# Patient Record
Sex: MALE | Race: WHITE | Employment: UNEMPLOYED | URBAN - METROPOLITAN AREA
[De-identification: names, ages, dates, MRNs, and addresses within clinical notes are randomized per-mention and may not be internally consistent; named-entity substitution may affect disease eponyms.]

---

## 2023-07-28 ENCOUNTER — APPOINTMENT (OUTPATIENT)
Dept: GENERAL RADIOLOGY | Facility: HOSPITAL | Age: 73
End: 2023-07-28
Attending: EMERGENCY MEDICINE
Payer: MEDICARE

## 2023-07-28 ENCOUNTER — HOSPITAL ENCOUNTER (INPATIENT)
Facility: HOSPITAL | Age: 73
LOS: 4 days | Discharge: HOME | End: 2023-08-01
Attending: EMERGENCY MEDICINE | Admitting: INTERNAL MEDICINE
Payer: MEDICARE

## 2023-07-28 ENCOUNTER — APPOINTMENT (OUTPATIENT)
Dept: CT IMAGING | Facility: HOSPITAL | Age: 73
End: 2023-07-28
Attending: EMERGENCY MEDICINE
Payer: MEDICARE

## 2023-07-28 DIAGNOSIS — R77.8 ELEVATED TROPONIN: ICD-10-CM

## 2023-07-28 DIAGNOSIS — R73.9 HYPERGLYCEMIA: ICD-10-CM

## 2023-07-28 DIAGNOSIS — R55 SYNCOPE, CARDIOGENIC: Primary | ICD-10-CM

## 2023-07-28 LAB
ALBUMIN SERPL-MCNC: 3.5 G/DL (ref 3.4–5)
ALBUMIN/GLOB SERPL: 0.9 {RATIO} (ref 1–2)
ALP LIVER SERPL-CCNC: 92 U/L
ALT SERPL-CCNC: 19 U/L
ANION GAP SERPL CALC-SCNC: 8 MMOL/L (ref 0–18)
AST SERPL-CCNC: 15 U/L (ref 15–37)
BASOPHILS # BLD AUTO: 0.09 X10(3) UL (ref 0–0.2)
BASOPHILS NFR BLD AUTO: 1 %
BILIRUB SERPL-MCNC: 0.6 MG/DL (ref 0.1–2)
BILIRUB UR QL STRIP.AUTO: NEGATIVE
BUN BLD-MCNC: 22 MG/DL (ref 7–18)
CALCIUM BLD-MCNC: 8.7 MG/DL (ref 8.5–10.1)
CHLORIDE SERPL-SCNC: 100 MMOL/L (ref 98–112)
CHOLEST SERPL-MCNC: 166 MG/DL (ref ?–200)
CLARITY UR REFRACT.AUTO: CLEAR
CO2 SERPL-SCNC: 28 MMOL/L (ref 21–32)
CREAT BLD-MCNC: 1.32 MG/DL
D DIMER PPP FEU-MCNC: <0.27 UG/ML FEU (ref ?–0.71)
EGFRCR SERPLBLD CKD-EPI 2021: 58 ML/MIN/1.73M2 (ref 60–?)
EOSINOPHIL # BLD AUTO: 0.11 X10(3) UL (ref 0–0.7)
EOSINOPHIL NFR BLD AUTO: 1.2 %
ERYTHROCYTE [DISTWIDTH] IN BLOOD BY AUTOMATED COUNT: 12.9 %
GLOBULIN PLAS-MCNC: 3.8 G/DL (ref 2.8–4.4)
GLUCOSE BLD-MCNC: 236 MG/DL (ref 70–99)
GLUCOSE BLD-MCNC: 269 MG/DL (ref 70–99)
GLUCOSE BLD-MCNC: 285 MG/DL (ref 70–99)
GLUCOSE UR STRIP.AUTO-MCNC: >=500 MG/DL
HCT VFR BLD AUTO: 47.1 %
HDLC SERPL-MCNC: 45 MG/DL (ref 40–59)
HGB BLD-MCNC: 16.1 G/DL
IMM GRANULOCYTES # BLD AUTO: 0.04 X10(3) UL (ref 0–1)
IMM GRANULOCYTES NFR BLD: 0.4 %
KETONES UR STRIP.AUTO-MCNC: 20 MG/DL
LDLC SERPL CALC-MCNC: 93 MG/DL (ref ?–100)
LEUKOCYTE ESTERASE UR QL STRIP.AUTO: NEGATIVE
LYMPHOCYTES # BLD AUTO: 2.4 X10(3) UL (ref 1–4)
LYMPHOCYTES NFR BLD AUTO: 26.3 %
MAGNESIUM SERPL-MCNC: 2.2 MG/DL (ref 1.6–2.6)
MCH RBC QN AUTO: 29.5 PG (ref 26–34)
MCHC RBC AUTO-ENTMCNC: 34.2 G/DL (ref 31–37)
MCV RBC AUTO: 86.3 FL
MONOCYTES # BLD AUTO: 0.6 X10(3) UL (ref 0.1–1)
MONOCYTES NFR BLD AUTO: 6.6 %
NEUTROPHILS # BLD AUTO: 5.9 X10 (3) UL (ref 1.5–7.7)
NEUTROPHILS # BLD AUTO: 5.9 X10(3) UL (ref 1.5–7.7)
NEUTROPHILS NFR BLD AUTO: 64.5 %
NITRITE UR QL STRIP.AUTO: NEGATIVE
NONHDLC SERPL-MCNC: 121 MG/DL (ref ?–130)
OSMOLALITY SERPL CALC.SUM OF ELEC: 296 MOSM/KG (ref 275–295)
PH UR STRIP.AUTO: 6 [PH] (ref 5–8)
PLATELET # BLD AUTO: 161 10(3)UL (ref 150–450)
POTASSIUM SERPL-SCNC: 3.4 MMOL/L (ref 3.5–5.1)
PROT SERPL-MCNC: 7.3 G/DL (ref 6.4–8.2)
PROT UR STRIP.AUTO-MCNC: NEGATIVE MG/DL
RBC # BLD AUTO: 5.46 X10(6)UL
RBC UR QL AUTO: NEGATIVE
SODIUM SERPL-SCNC: 136 MMOL/L (ref 136–145)
SP GR UR STRIP.AUTO: 1.03 (ref 1–1.03)
TRIGL SERPL-MCNC: 158 MG/DL (ref 30–149)
TROPONIN I HIGH SENSITIVITY: 77 NG/L
TROPONIN I HIGH SENSITIVITY: 84 NG/L
TSI SER-ACNC: 2.97 MIU/ML (ref 0.36–3.74)
UROBILINOGEN UR STRIP.AUTO-MCNC: <2 MG/DL
VLDLC SERPL CALC-MCNC: 26 MG/DL (ref 0–30)
WBC # BLD AUTO: 9.1 X10(3) UL (ref 4–11)

## 2023-07-28 PROCEDURE — 71045 X-RAY EXAM CHEST 1 VIEW: CPT | Performed by: EMERGENCY MEDICINE

## 2023-07-28 PROCEDURE — 70450 CT HEAD/BRAIN W/O DYE: CPT | Performed by: EMERGENCY MEDICINE

## 2023-07-28 PROCEDURE — 99223 1ST HOSP IP/OBS HIGH 75: CPT | Performed by: INTERNAL MEDICINE

## 2023-07-28 RX ORDER — SODIUM CHLORIDE 9 MG/ML
INJECTION, SOLUTION INTRAVENOUS CONTINUOUS
Status: DISCONTINUED | OUTPATIENT
Start: 2023-07-28 | End: 2023-08-01

## 2023-07-28 RX ORDER — POTASSIUM CHLORIDE 20 MEQ/1
40 TABLET, EXTENDED RELEASE ORAL EVERY 4 HOURS
Status: COMPLETED | OUTPATIENT
Start: 2023-07-28 | End: 2023-07-29

## 2023-07-28 RX ORDER — ENOXAPARIN SODIUM 100 MG/ML
40 INJECTION SUBCUTANEOUS DAILY
Status: DISCONTINUED | OUTPATIENT
Start: 2023-07-29 | End: 2023-08-01

## 2023-07-28 RX ORDER — LOSARTAN POTASSIUM 100 MG/1
100 TABLET ORAL DAILY
Status: DISCONTINUED | OUTPATIENT
Start: 2023-07-29 | End: 2023-08-01

## 2023-07-28 RX ORDER — METOPROLOL SUCCINATE 50 MG/1
50 TABLET, EXTENDED RELEASE ORAL DAILY
COMMUNITY

## 2023-07-28 RX ORDER — SENNOSIDES 8.6 MG
17.2 TABLET ORAL NIGHTLY PRN
Status: DISCONTINUED | OUTPATIENT
Start: 2023-07-28 | End: 2023-08-01

## 2023-07-28 RX ORDER — LOSARTAN POTASSIUM 25 MG/1
TABLET ORAL DAILY
COMMUNITY
End: 2023-07-28

## 2023-07-28 RX ORDER — ACETAMINOPHEN 500 MG
1000 TABLET ORAL EVERY 8 HOURS PRN
Status: DISCONTINUED | OUTPATIENT
Start: 2023-07-28 | End: 2023-08-01

## 2023-07-28 RX ORDER — NICOTINE POLACRILEX 4 MG
30 LOZENGE BUCCAL
Status: DISCONTINUED | OUTPATIENT
Start: 2023-07-28 | End: 2023-08-01

## 2023-07-28 RX ORDER — DEXTROSE MONOHYDRATE 25 G/50ML
50 INJECTION, SOLUTION INTRAVENOUS
Status: DISCONTINUED | OUTPATIENT
Start: 2023-07-28 | End: 2023-08-01

## 2023-07-28 RX ORDER — MELATONIN
3 NIGHTLY PRN
Status: DISCONTINUED | OUTPATIENT
Start: 2023-07-28 | End: 2023-08-01

## 2023-07-28 RX ORDER — ENEMA 19; 7 G/133ML; G/133ML
1 ENEMA RECTAL ONCE AS NEEDED
Status: DISCONTINUED | OUTPATIENT
Start: 2023-07-28 | End: 2023-08-01

## 2023-07-28 RX ORDER — NICOTINE POLACRILEX 4 MG
15 LOZENGE BUCCAL
Status: DISCONTINUED | OUTPATIENT
Start: 2023-07-28 | End: 2023-08-01

## 2023-07-28 RX ORDER — METOCLOPRAMIDE HYDROCHLORIDE 5 MG/ML
10 INJECTION INTRAMUSCULAR; INTRAVENOUS EVERY 8 HOURS PRN
Status: DISCONTINUED | OUTPATIENT
Start: 2023-07-28 | End: 2023-08-01

## 2023-07-28 RX ORDER — POLYETHYLENE GLYCOL 3350 17 G/17G
17 POWDER, FOR SOLUTION ORAL DAILY PRN
Status: DISCONTINUED | OUTPATIENT
Start: 2023-07-28 | End: 2023-08-01

## 2023-07-28 RX ORDER — BISACODYL 10 MG
10 SUPPOSITORY, RECTAL RECTAL
Status: DISCONTINUED | OUTPATIENT
Start: 2023-07-28 | End: 2023-08-01

## 2023-07-28 RX ORDER — VERAPAMIL HYDROCHLORIDE 240 MG/1
240 TABLET, FILM COATED, EXTENDED RELEASE ORAL NIGHTLY
COMMUNITY
End: 2023-08-01

## 2023-07-28 RX ORDER — ONDANSETRON 2 MG/ML
4 INJECTION INTRAMUSCULAR; INTRAVENOUS EVERY 6 HOURS PRN
Status: DISCONTINUED | OUTPATIENT
Start: 2023-07-28 | End: 2023-07-29

## 2023-07-28 RX ORDER — ASPIRIN 81 MG/1
81 TABLET ORAL DAILY
Status: DISCONTINUED | OUTPATIENT
Start: 2023-07-29 | End: 2023-08-01

## 2023-07-28 RX ORDER — EMPAGLIFLOZIN 25 MG/1
1 TABLET, FILM COATED ORAL DAILY
COMMUNITY
End: 2023-08-01

## 2023-07-28 RX ORDER — ASPIRIN 81 MG/1
324 TABLET, CHEWABLE ORAL ONCE
Status: COMPLETED | OUTPATIENT
Start: 2023-07-28 | End: 2023-07-28

## 2023-07-28 RX ORDER — ASPIRIN 81 MG/1
81 TABLET ORAL DAILY
COMMUNITY

## 2023-07-29 ENCOUNTER — APPOINTMENT (OUTPATIENT)
Dept: CV DIAGNOSTICS | Facility: HOSPITAL | Age: 73
End: 2023-07-29
Attending: NURSE PRACTITIONER
Payer: MEDICARE

## 2023-07-29 ENCOUNTER — APPOINTMENT (OUTPATIENT)
Dept: CV DIAGNOSTICS | Facility: HOSPITAL | Age: 73
End: 2023-07-29
Attending: INTERNAL MEDICINE
Payer: MEDICARE

## 2023-07-29 PROBLEM — E11.9 DIABETES MELLITUS (HCC): Status: ACTIVE | Noted: 2023-07-29

## 2023-07-29 LAB
ANION GAP SERPL CALC-SCNC: 5 MMOL/L (ref 0–18)
ATRIAL RATE: 54 BPM
ATRIAL RATE: 59 BPM
BASOPHILS # BLD AUTO: 0.06 X10(3) UL (ref 0–0.2)
BASOPHILS NFR BLD AUTO: 0.6 %
BUN BLD-MCNC: 20 MG/DL (ref 7–18)
CALCIUM BLD-MCNC: 8.7 MG/DL (ref 8.5–10.1)
CHLORIDE SERPL-SCNC: 103 MMOL/L (ref 98–112)
CO2 SERPL-SCNC: 28 MMOL/L (ref 21–32)
CREAT BLD-MCNC: 1.2 MG/DL
EGFRCR SERPLBLD CKD-EPI 2021: 65 ML/MIN/1.73M2 (ref 60–?)
EOSINOPHIL # BLD AUTO: 0.07 X10(3) UL (ref 0–0.7)
EOSINOPHIL NFR BLD AUTO: 0.7 %
ERYTHROCYTE [DISTWIDTH] IN BLOOD BY AUTOMATED COUNT: 12.7 %
EST. AVERAGE GLUCOSE BLD GHB EST-MCNC: 335 MG/DL (ref 68–126)
GLUCOSE BLD-MCNC: 216 MG/DL (ref 70–99)
GLUCOSE BLD-MCNC: 258 MG/DL (ref 70–99)
GLUCOSE BLD-MCNC: 266 MG/DL (ref 70–99)
GLUCOSE BLD-MCNC: 283 MG/DL (ref 70–99)
GLUCOSE BLD-MCNC: 407 MG/DL (ref 70–99)
HBA1C MFR BLD: 13.3 % (ref ?–5.7)
HCT VFR BLD AUTO: 45.9 %
HGB BLD-MCNC: 15.5 G/DL
IMM GRANULOCYTES # BLD AUTO: 0.04 X10(3) UL (ref 0–1)
IMM GRANULOCYTES NFR BLD: 0.4 %
LYMPHOCYTES # BLD AUTO: 2.14 X10(3) UL (ref 1–4)
LYMPHOCYTES NFR BLD AUTO: 22.3 %
MAGNESIUM SERPL-MCNC: 2.2 MG/DL (ref 1.6–2.6)
MCH RBC QN AUTO: 29.1 PG (ref 26–34)
MCHC RBC AUTO-ENTMCNC: 33.8 G/DL (ref 31–37)
MCV RBC AUTO: 86.1 FL
MONOCYTES # BLD AUTO: 0.76 X10(3) UL (ref 0.1–1)
MONOCYTES NFR BLD AUTO: 7.9 %
NEUTROPHILS # BLD AUTO: 6.52 X10 (3) UL (ref 1.5–7.7)
NEUTROPHILS # BLD AUTO: 6.52 X10(3) UL (ref 1.5–7.7)
NEUTROPHILS NFR BLD AUTO: 68.1 %
OSMOLALITY SERPL CALC.SUM OF ELEC: 293 MOSM/KG (ref 275–295)
P AXIS: 102 DEGREES
P AXIS: 47 DEGREES
P-R INTERVAL: 196 MS
P-R INTERVAL: 204 MS
PLATELET # BLD AUTO: 152 10(3)UL (ref 150–450)
POTASSIUM SERPL-SCNC: 3.1 MMOL/L (ref 3.5–5.1)
POTASSIUM SERPL-SCNC: 3.1 MMOL/L (ref 3.5–5.1)
POTASSIUM SERPL-SCNC: 3.5 MMOL/L (ref 3.5–5.1)
POTASSIUM SERPL-SCNC: 4.1 MMOL/L (ref 3.5–5.1)
Q-T INTERVAL: 572 MS
Q-T INTERVAL: 580 MS
QRS DURATION: 86 MS
QRS DURATION: 92 MS
QTC CALCULATION (BEZET): 542 MS
QTC CALCULATION (BEZET): 574 MS
R AXIS: -41 DEGREES
R AXIS: -45 DEGREES
RBC # BLD AUTO: 5.33 X10(6)UL
SODIUM SERPL-SCNC: 136 MMOL/L (ref 136–145)
T AXIS: 63 DEGREES
T AXIS: 78 DEGREES
TROPONIN I HIGH SENSITIVITY: 112 NG/L
TROPONIN I HIGH SENSITIVITY: 85 NG/L
TROPONIN I HIGH SENSITIVITY: 97 NG/L
VENTRICULAR RATE: 54 BPM
VENTRICULAR RATE: 59 BPM
WBC # BLD AUTO: 9.6 X10(3) UL (ref 4–11)

## 2023-07-29 PROCEDURE — 93306 TTE W/DOPPLER COMPLETE: CPT | Performed by: INTERNAL MEDICINE

## 2023-07-29 PROCEDURE — 93350 STRESS TTE ONLY: CPT | Performed by: NURSE PRACTITIONER

## 2023-07-29 PROCEDURE — 99232 SBSQ HOSP IP/OBS MODERATE 35: CPT | Performed by: INTERNAL MEDICINE

## 2023-07-29 PROCEDURE — 93018 CV STRESS TEST I&R ONLY: CPT | Performed by: NURSE PRACTITIONER

## 2023-07-29 PROCEDURE — 93017 CV STRESS TEST TRACING ONLY: CPT | Performed by: NURSE PRACTITIONER

## 2023-07-29 RX ORDER — AMLODIPINE BESYLATE 5 MG/1
5 TABLET ORAL DAILY
Status: DISCONTINUED | OUTPATIENT
Start: 2023-07-29 | End: 2023-08-01

## 2023-07-29 RX ORDER — POTASSIUM CHLORIDE 20 MEQ/1
40 TABLET, EXTENDED RELEASE ORAL EVERY 4 HOURS
Status: COMPLETED | OUTPATIENT
Start: 2023-07-29 | End: 2023-07-29

## 2023-07-29 NOTE — ED QUICK NOTES
Orders for admission, patient is aware of plan and ready to go upstairs. Any questions, please call ED RN Sadi Mccullough at extension [phone].      Patient Covid vaccination status: Unvaccinated     COVID Test Ordered in ED: None    COVID Suspicion at Admission: N/A    Running Infusions:  None    Mental Status/LOC at time of transport: a/ox4    Other pertinent information:   CIWA score: N/A   NIH score:  N/A

## 2023-07-29 NOTE — PLAN OF CARE
Elizabet Hobbs Patient Status:  Inpatient    10/18/1950 MRN [id number]   [facility] 2NE-A Attending Eric Mcknight, DO   Hosp Day # 1 PCP None Pcp       Cardiology Nocturnal APN Note    Page Received: Dr. Noelle King, ED Physician @ [phone]    HPI:     Patient is a 71year old male with PMH of HTN and DM II who presented to the ED after experiencing a syncopal episode while at a wedding. Patient reported being outside approximately 20 minutes began to feel lightheaded and overheated. Patient reported losing consciousness for few seconds while sitting down. Patient reported having a slight headache on arrival to ED. Family reported patient was diaphoretic during the syncopal episode. In ED, labs showed elevated troponin. Patient with no complaints of chest pain or dyspnea. [facility] consulted for syncope and elevated troponin. Exercise stress test in 2019 was negative for ischemia. No other previous cardiology records available for review. HPI obtained from chart review and information provided by ED physician. ED Clinical Course    EKG: Sinus bradycardia (rate 50s). No acute ischemic changes    Labs: K+ 3.4, Cr 1.32, Trop 84, D-dimer negative    Imaging: CXR unremarkable.  CT brain/head unremarkable    Medications: Aspirin 324 mg po x 1, IV fluids            Vital Signs:       2023    11:21 PM 2023    11:22 PM   Vitals History   /79 151/77   BP Location Left arm Left arm   Weight  166 lbs 10 oz   BMI  24.6 kg/m2        Labs:   Lab Results   Component Value Date    WBC 9.1 2023    HGB 16.1 2023    HCT 47.1 2023    .0 2023    CREATSERUM 1.32 2023    BUN 22 2023     2023    K 3.4 2023     2023    CO2 28.0 2023     2023    CA 8.7 2023    ALB 3.5 2023    ALKPHO 92 2023    BILT 0.6 2023    TP 7.3 2023    AST 15 2023    ALT 19 2023    TSH 2.970 07/28/2023    DDIMER <0.27 07/28/2023    MG 2.2 07/28/2023    TROPHS 85 07/28/2023       Diagnostics:   CT BRAIN OR HEAD (29569)    Result Date: 7/28/2023  CONCLUSION:  1. No acute intracranial hemorrhage. 2. Findings most consistent with chronic small vessel ischemic change within the deep white matter. If an acute infarct is of high clinical concern, recommend MRI of the brain for further evaluation. LOCATION:  [place]   Dictated by (CST): Cecil Trujillo MD on 7/28/2023 at 9:26 PM     Finalized by (CST): Cecil Trujillo MD on 7/28/2023 at 9:27 PM       XR CHEST AP PORTABLE  (CPT=71045)    Result Date: 7/28/2023  CONCLUSION:  No focal consolidation. LOCATION:  [place]      Dictated by (CST): Cecil Trujillo MD on 7/28/2023 at 8:38 PM     Finalized by (CST): Cecil Trujillo MD on 7/28/2023 at 8:38 PM        Allergies:    Penicillin V            NAUSEA ONLY    Medications:    amLODIPine    insulin detemir    aspirin    losartan    sertraline    glucose **OR** glucose **OR** glucose-vitamin C **OR** dextrose **OR** glucose **OR** glucose **OR** glucose-vitamin C    insulin aspart    acetaminophen    melatonin    sodium chloride    enoxaparin    metoclopramide    polyethylene glycol (PEG 3350)    sennosides    bisacodyl    fleet enema    potassium chloride       Assessment/Plan:    -Tropinin 84-->85, am draw pending  - 2D echocardiogram pending  - Continue to monitor overnight on telemetry. - Formal cardiology consult to follow in AM.     Larissa Luther, [address]  7/29/2023  3:38 AM

## 2023-07-29 NOTE — ED INITIAL ASSESSMENT (HPI)
Patient out today at family gathering, also cut grass today and was not drinking much water. Per patient he Verlon Holiday passed out for a couple seconds\". Patient arrives awake and alert.

## 2023-07-29 NOTE — PLAN OF CARE
NURSING ADMISSION NOTE      Patient admitted via cart. Oriented to room. Safety precautions initiated. Bed in low position. Call light in reach. Admitted a 71 yrs old male from ER alert and oriented x4 with diagnosis of syncope. Placed on bed comfortably and on cardiac monitor hr 60's sinus rhythm. Vital signs taken. Denies any pain. Still c/o lightheadedness at times and made aware to call when getting out of bed. Plan of care given and verbalized understanding. All needs attended and will continue to monitor.

## 2023-07-29 NOTE — PROGRESS NOTES
CARDIACDIAGNOSTICS NOTE      Inpatient stress echo ordered by DANISHA Hi. Resting vitals 134/78, 67. The patient walked 6:50 on standard Jayson protocol. The test was terminated due to fatigue and pt's inability to ambulate safely and independently on treadmill. Peak HR was 121 (81% APMHR). The patient denied any cardiac symptoms. Echo imaging pending cardiology review. Dr. Pedraza Client paged at [phone] to read test in cardiology bin. Lamar Su RN

## 2023-07-29 NOTE — PLAN OF CARE
Per Dr. Jesenia Butler, stress echocardiogram with a small abnormality. Per Dr. Jesenia Butler, further evaluation is needed and recommends further testing during this admission.

## 2023-07-30 LAB
GLUCOSE BLD-MCNC: 160 MG/DL (ref 70–99)
GLUCOSE BLD-MCNC: 210 MG/DL (ref 70–99)
GLUCOSE BLD-MCNC: 239 MG/DL (ref 70–99)
GLUCOSE BLD-MCNC: 341 MG/DL (ref 70–99)

## 2023-07-30 PROCEDURE — 99232 SBSQ HOSP IP/OBS MODERATE 35: CPT | Performed by: INTERNAL MEDICINE

## 2023-07-30 RX ORDER — METOPROLOL TARTRATE 100 MG/1
100 TABLET ORAL ONCE
Status: DISCONTINUED | OUTPATIENT
Start: 2023-07-30 | End: 2023-08-01

## 2023-07-30 RX ORDER — DILTIAZEM HYDROCHLORIDE 5 MG/ML
5 INJECTION INTRAVENOUS SEE ADMIN INSTRUCTIONS
Status: CANCELLED | OUTPATIENT
Start: 2023-07-30

## 2023-07-30 RX ORDER — METOPROLOL TARTRATE 50 MG/1
50 TABLET, FILM COATED ORAL ONCE
Status: DISCONTINUED | OUTPATIENT
Start: 2023-07-31 | End: 2023-08-01

## 2023-07-30 RX ORDER — METOPROLOL TARTRATE 5 MG/5ML
5 INJECTION INTRAVENOUS SEE ADMIN INSTRUCTIONS
Status: DISCONTINUED | OUTPATIENT
Start: 2023-07-30 | End: 2023-07-31 | Stop reason: HOSPADM

## 2023-07-30 RX ORDER — OMEGA-3 FATTY ACIDS/FISH OIL 300-1000MG
200 CAPSULE ORAL 2 TIMES DAILY PRN
COMMUNITY

## 2023-07-30 RX ORDER — METOPROLOL TARTRATE 100 MG/1
100 TABLET ORAL ONCE
Status: COMPLETED | OUTPATIENT
Start: 2023-07-30 | End: 2023-07-30

## 2023-07-30 RX ORDER — METOPROLOL TARTRATE 5 MG/5ML
5 INJECTION INTRAVENOUS SEE ADMIN INSTRUCTIONS
Status: CANCELLED | OUTPATIENT
Start: 2023-07-30

## 2023-07-30 RX ORDER — METOPROLOL TARTRATE 50 MG/1
50 TABLET, FILM COATED ORAL ONCE
Status: COMPLETED | OUTPATIENT
Start: 2023-07-30 | End: 2023-07-30

## 2023-07-30 RX ORDER — METOPROLOL TARTRATE 100 MG/1
100 TABLET ORAL ONCE
Status: DISCONTINUED | OUTPATIENT
Start: 2023-07-31 | End: 2023-08-01

## 2023-07-30 RX ORDER — METOPROLOL TARTRATE 50 MG/1
50 TABLET, FILM COATED ORAL ONCE AS NEEDED
Status: DISCONTINUED | OUTPATIENT
Start: 2023-07-30 | End: 2023-08-01

## 2023-07-30 RX ORDER — NITROGLYCERIN 0.4 MG/1
0.4 TABLET SUBLINGUAL ONCE
Status: COMPLETED | OUTPATIENT
Start: 2023-07-30 | End: 2023-07-31

## 2023-07-30 RX ORDER — METOPROLOL TARTRATE 50 MG/1
50 TABLET, FILM COATED ORAL ONCE
Status: DISCONTINUED | OUTPATIENT
Start: 2023-07-30 | End: 2023-08-01

## 2023-07-30 RX ORDER — METOPROLOL TARTRATE 100 MG/1
100 TABLET ORAL ONCE AS NEEDED
Status: ACTIVE | OUTPATIENT
Start: 2023-07-31 | End: 2023-07-31

## 2023-07-30 RX ORDER — NITROGLYCERIN 0.4 MG/1
0.4 TABLET SUBLINGUAL ONCE
Status: CANCELLED | OUTPATIENT
Start: 2023-07-30 | End: 2023-07-30

## 2023-07-30 RX ORDER — METOPROLOL TARTRATE 100 MG/1
100 TABLET ORAL ONCE AS NEEDED
Status: DISCONTINUED | OUTPATIENT
Start: 2023-07-30 | End: 2023-08-01

## 2023-07-30 RX ORDER — METOPROLOL TARTRATE 50 MG/1
50 TABLET, FILM COATED ORAL ONCE AS NEEDED
Status: ACTIVE | OUTPATIENT
Start: 2023-07-31 | End: 2023-07-31

## 2023-07-30 RX ORDER — DILTIAZEM HYDROCHLORIDE 5 MG/ML
5 INJECTION INTRAVENOUS SEE ADMIN INSTRUCTIONS
Status: DISCONTINUED | OUTPATIENT
Start: 2023-07-30 | End: 2023-07-31 | Stop reason: HOSPADM

## 2023-07-30 RX ORDER — METOPROLOL TARTRATE 50 MG/1
50 TABLET, FILM COATED ORAL ONCE AS NEEDED
Status: ACTIVE | OUTPATIENT
Start: 2023-07-30 | End: 2023-07-30

## 2023-07-30 RX ORDER — METOPROLOL TARTRATE 100 MG/1
100 TABLET ORAL ONCE AS NEEDED
Status: ACTIVE | OUTPATIENT
Start: 2023-07-30 | End: 2023-07-30

## 2023-07-30 NOTE — PLAN OF CARE
Ambulated in the halls today,denies any chest pain. No sob  Denies dizziness,no lighheadness  SB Hr 60's on the monitor  CT Coronary Angiogram tomorrow,protocol orders noted  To call CT dept in am for timing of CTA  Continue IV fluids 0.9Ns at 100cc/hr  Will monitor      Problem: CARDIOVASCULAR - ADULT  Goal: Maintains optimal cardiac output and hemodynamic stability  Description: INTERVENTIONS:  - Monitor vital signs, rhythm, and trends  - Monitor for bleeding, hypotension and signs of decreased cardiac output  - Evaluate effectiveness of vasoactive medications to optimize hemodynamic stability  - Monitor arterial and/or venous puncture sites for bleeding and/or hematoma  - Assess quality of pulses, skin color and temperature  - Assess for signs of decreased coronary artery perfusion - ex. Angina  - Evaluate fluid balance, assess for edema, trend weights  Outcome: Progressing  Goal: Absence of cardiac arrhythmias or at baseline  Description: INTERVENTIONS:  - Continuous cardiac monitoring, monitor vital signs, obtain 12 lead EKG if indicated  - Evaluate effectiveness of antiarrhythmic and heart rate control medications as ordered  - Initiate emergency measures for life threatening arrhythmias  - Monitor electrolytes and administer replacement therapy as ordered  Outcome: Progressing     Problem: NEUROLOGICAL - ADULT  Goal: Achieves stable or improved neurological status  Description: INTERVENTIONS  - Assess for and report changes in neurological status  - Initiate measures to prevent increased intracranial pressure  - Maintain blood pressure and fluid volume within ordered parameters to optimize cerebral perfusion and minimize risk of hemorrhage  - Monitor temperature, glucose, and sodium.  Initiate appropriate interventions as ordered  Outcome: Progressing

## 2023-07-30 NOTE — PLAN OF CARE
Problem: Patient/Family Goals  Goal: Patient/Family Long Term Goal  Description: Patient's Long Term Goal: will be able to go home without any complication    Interventions:  -  - See additional Care Plan goals for specific interventions  Outcome: Progressing  Goal: Patient/Family Short Term Goal  Description: Patient's Short Term Goal: will have no more syncopal episodes    Interventions:   -  - See additional Care Plan goals for specific interventions  Outcome: Progressing     Problem: CARDIOVASCULAR - ADULT  Goal: Maintains optimal cardiac output and hemodynamic stability  Description: INTERVENTIONS:  - Monitor vital signs, rhythm, and trends  - Monitor for bleeding, hypotension and signs of decreased cardiac output  - Evaluate effectiveness of vasoactive medications to optimize hemodynamic stability  - Monitor arterial and/or venous puncture sites for bleeding and/or hematoma  - Assess quality of pulses, skin color and temperature  - Assess for signs of decreased coronary artery perfusion - ex. Angina  - Evaluate fluid balance, assess for edema, trend weights  Outcome: Progressing  Goal: Absence of cardiac arrhythmias or at baseline  Description: INTERVENTIONS:  - Continuous cardiac monitoring, monitor vital signs, obtain 12 lead EKG if indicated  - Evaluate effectiveness of antiarrhythmic and heart rate control medications as ordered  - Initiate emergency measures for life threatening arrhythmias  - Monitor electrolytes and administer replacement therapy as ordered  Outcome: Progressing     Problem: NEUROLOGICAL - ADULT  Goal: Achieves stable or improved neurological status  Description: INTERVENTIONS  - Assess for and report changes in neurological status  - Initiate measures to prevent increased intracranial pressure  - Maintain blood pressure and fluid volume within ordered parameters to optimize cerebral perfusion and minimize risk of hemorrhage  - Monitor temperature, glucose, and sodium.  Initiate appropriate interventions as ordered  Outcome: Progressing

## 2023-07-31 ENCOUNTER — APPOINTMENT (OUTPATIENT)
Dept: INTERVENTIONAL RADIOLOGY/VASCULAR | Facility: HOSPITAL | Age: 73
End: 2023-07-31
Attending: NURSE PRACTITIONER
Payer: MEDICARE

## 2023-07-31 ENCOUNTER — APPOINTMENT (OUTPATIENT)
Dept: CT IMAGING | Facility: HOSPITAL | Age: 73
End: 2023-07-31
Attending: NURSE PRACTITIONER
Payer: MEDICARE

## 2023-07-31 PROBLEM — E11.65 UNCONTROLLED TYPE 2 DIABETES MELLITUS WITH HYPERGLYCEMIA, WITHOUT LONG-TERM CURRENT USE OF INSULIN (HCC): Status: ACTIVE | Noted: 2023-07-31

## 2023-07-31 LAB
CREAT BLD-MCNC: 0.7 MG/DL
EGFRCR SERPLBLD CKD-EPI 2021: 99 ML/MIN/1.73M2 (ref 60–?)
GLUCOSE BLD-MCNC: 138 MG/DL (ref 70–99)
GLUCOSE BLD-MCNC: 229 MG/DL (ref 70–99)
GLUCOSE BLD-MCNC: 239 MG/DL (ref 70–99)

## 2023-07-31 PROCEDURE — B2111ZZ FLUOROSCOPY OF MULTIPLE CORONARY ARTERIES USING LOW OSMOLAR CONTRAST: ICD-10-PCS | Performed by: INTERNAL MEDICINE

## 2023-07-31 PROCEDURE — 4A023N7 MEASUREMENT OF CARDIAC SAMPLING AND PRESSURE, LEFT HEART, PERCUTANEOUS APPROACH: ICD-10-PCS | Performed by: INTERNAL MEDICINE

## 2023-07-31 PROCEDURE — 99232 SBSQ HOSP IP/OBS MODERATE 35: CPT | Performed by: INTERNAL MEDICINE

## 2023-07-31 PROCEDURE — 99232 SBSQ HOSP IP/OBS MODERATE 35: CPT | Performed by: CLINICAL NURSE SPECIALIST

## 2023-07-31 PROCEDURE — 75574 CT ANGIO HRT W/3D IMAGE: CPT | Performed by: NURSE PRACTITIONER

## 2023-07-31 RX ORDER — NITROGLYCERIN 0.4 MG/1
0.4 TABLET SUBLINGUAL EVERY 5 MIN PRN
Status: DISCONTINUED | OUTPATIENT
Start: 2023-07-31 | End: 2023-07-31

## 2023-07-31 RX ORDER — VERAPAMIL HYDROCHLORIDE 2.5 MG/ML
INJECTION, SOLUTION INTRAVENOUS
Status: COMPLETED
Start: 2023-07-31 | End: 2023-07-31

## 2023-07-31 RX ORDER — HEPARIN SODIUM 5000 [USP'U]/ML
INJECTION, SOLUTION INTRAVENOUS; SUBCUTANEOUS
Status: COMPLETED
Start: 2023-07-31 | End: 2023-07-31

## 2023-07-31 RX ORDER — ATORVASTATIN CALCIUM 20 MG/1
20 TABLET, FILM COATED ORAL NIGHTLY
Status: DISCONTINUED | OUTPATIENT
Start: 2023-07-31 | End: 2023-08-01

## 2023-07-31 RX ORDER — LIDOCAINE HYDROCHLORIDE 10 MG/ML
INJECTION, SOLUTION EPIDURAL; INFILTRATION; INTRACAUDAL; PERINEURAL
Status: COMPLETED
Start: 2023-07-31 | End: 2023-07-31

## 2023-07-31 RX ORDER — HYDRALAZINE HYDROCHLORIDE 20 MG/ML
10 INJECTION INTRAMUSCULAR; INTRAVENOUS
Status: DISCONTINUED | OUTPATIENT
Start: 2023-07-31 | End: 2023-08-01

## 2023-07-31 RX ORDER — SODIUM CHLORIDE 9 MG/ML
INJECTION, SOLUTION INTRAVENOUS
Status: DISCONTINUED | OUTPATIENT
Start: 2023-08-01 | End: 2023-07-31 | Stop reason: HOSPADM

## 2023-07-31 RX ORDER — NITROGLYCERIN 0.4 MG/1
TABLET SUBLINGUAL
Status: COMPLETED
Start: 2023-07-31 | End: 2023-07-31

## 2023-07-31 RX ORDER — IBUPROFEN 400 MG/1
400 TABLET ORAL EVERY 6 HOURS PRN
Status: DISCONTINUED | OUTPATIENT
Start: 2023-07-31 | End: 2023-08-01

## 2023-07-31 RX ORDER — MIDAZOLAM HYDROCHLORIDE 1 MG/ML
INJECTION INTRAMUSCULAR; INTRAVENOUS
Status: COMPLETED
Start: 2023-07-31 | End: 2023-07-31

## 2023-07-31 NOTE — DISCHARGE INSTRUCTIONS
Detemir 18 units at bedtime  Aspart - Mealtime - 4 units; correction 1 unit for every 30 points blood glucose is greater than 140 mg/dl. Hold Jardiance - to discuss with outpatient provider about restarting       Follow up for Diabetes care: It is recommended that you follow up with an outpatient diabetes center. Please obtain a referral from your primary care doctor if you do not already have one. Below is the location and number of the [facility] nearest you. [place] location:  [address] [place] [address]  [phone]    [place] location:  [address] [place] [address]  [phone]    [place] location:  [address] [place] [address].   [phone]    [place] location:  [address]  [phone]    [place] location:  [address]  [phone]    [place] location:  [facility]  [phone]    [address] [place] [address]): To contact call [phone] for assistance with choosing Part D program that will provide the best coverage for your medications.

## 2023-07-31 NOTE — IMAGING NOTE
Pt arrives to room CT 4 at 09:10 AM. [facility] IV established to right AC with 18 gauge angiocath. Pt denies long acting nitrates. Pt positioned on CT table comfortably. Procedure explained and questions answered. O2 applied via NC at 2 L. VSS as noted in flowsheet. GFR = 99   imaging started at 09:24 AM    0.9NS flush followed by Omnipaque contrast at 09:28 AM    omnipaque contrast = 70 mL  0.9NS = 71 mL  Average HR = 60    Pt tolerated procedure without complication. Denies s/sx of contrast reaction. Pt A/O x 4 and denies pain. Ambulatory and in stable condition.  Dc'd back to 2612 at 09:35 AM.

## 2023-07-31 NOTE — DISCHARGE SUMMARY
Lupis Sue HOSPITALIST  DISCHARGE SUMMARY     Kevin Crespo Patient Status:  Inpatient    10/18/1950 MRN [id number]   [facility] 2NE-A Attending Millie Ribeiro MD   [address] Day # 4 PCP None Pcp     Date of Admission: 2023  Date of Discharge:  2023      Discharge Disposition: Final discharge disposition not confirmed    Discharge Diagnosis:  CAD  Syncope  Bradycardia  Prolonged QTC  DM2  Ess HTN      History of Present Illness: Kevin Crespo is a 71year old male with past medical history of hypertension and diabetes brought to the emergency department following a syncopal episode. Patient was at an outdoor wedding this evening. He was in a suit. He reports he was walking over to his seat when he started to feel lightheaded. He went he sat down and he put his head forward and was noted to have lost consciousness. He was diaphoretic. Family reports he came to after some brief time. He has no chest pain. No difficulty breathing. He has never passed out but he does endorse that he has felt lightheaded a couple of times. His only other complaint is that he gets electric-like sensations in his head that wake him up. Brief Synopsis: Pt was admitted and monitored on telemetry. He was seen by cardiology and underwent stress test and angiogram during stay. He discussed with [facility] and did not require surgery. He will follow up with cardiology for possible repeat angiogram with intervention vs medical management. During stay he was noted to have uncontrolled DM and was started on insulin. Lace+ Score: 47  59-90 High Risk  29-58 Medium Risk  0-28   Low Risk       TCM Follow-Up Recommendation:  LACE > 58:  High Risk of readmission after discharge from the hospital.      Procedures during hospitalization:   angiogram    Incidental or significant findings and recommendations (brief descriptions):  none    Lab/Test results pending at Discharge: none    Consultants:  Cardiology, [facility]    Discharge Medication List:     Discharge Medications        START taking these medications        Instructions Prescription details   amLODIPine 5 MG Tabs  Commonly known as: Norvasc      Take 1 tablet (5 mg total) by mouth daily. Quantity: 30 tablet  Refills: 3     atorvastatin 80 MG Tabs  Commonly known as: Lipitor      Take 1 tablet (80 mg total) by mouth nightly. Quantity: 30 tablet  Refills: 3     BD Pen Needle Stephanie U/F 32G X 4 MM Misc  Generic drug: Insulin Pen Needle      Use a new pen needle with each injection as directed by your doctor   Quantity: 100 each  Refills: 6     clopidogrel 75 MG Tabs  Commonly known as: Plavix      Take 1 tablet (75 mg total) by mouth daily. Quantity: 30 tablet  Refills: 3     insulin aspart 100 Units/mL Sopn  Commonly known as: NovoLOG      Inject 4 Units into the skin 3 (three) times daily with meals. Quantity: 3 mL  Refills: 0     insulin aspart 100 Units/mL Sopn  Commonly known as: NovoLOG      Inject 1-20 Units into the skin 4 (four) times daily before meals and nightly. Quantity: 3 mL  Refills: 0     insulin detemir 100 UNIT/ML Sopn  Commonly known as: Levemir      Inject 18 Units into the skin nightly. Quantity: 3 mL  Refills: 0     losartan 100 MG Tabs  Commonly known as: Cozaar      Take 1 tablet (100 mg total) by mouth daily.    Quantity: 30 tablet  Refills: 3     OneTouch Delica Lancets Fine Misc      Test blood glucose 3-4 times per day before meals   Quantity: 100 each  Refills: 6     OneTouch Verio Flex System w/Device Kit      Test blood glucose 3-4 times per day before meals   Quantity: 1 kit  Refills: 0     OneTouch Verio Strp      Test blood glucose 3-4 times per day before meals   Quantity: 100 strip  Refills: 6            CONTINUE taking these medications        Instructions Prescription details   Advil 200 MG Caps  Generic drug: Ibuprofen      Take 1 capsule (200 mg total) by mouth 2 (two) times daily as needed (toothache). 1-2 caps bid prn   Refills: 0     aspirin 81 MG Tbec      Take 1 tablet (81 mg total) by mouth daily. Refills: 0     metFORMIN 500 MG Tabs  Commonly known as: Glucophage      Take 2 tablets (1,000 mg total) by mouth daily with breakfast.   Refills: 0     metoprolol succinate ER 50 MG Tb24  Commonly known as: Toprol XL      Take 1 tablet (50 mg total) by mouth daily. Refills: 0     sertraline 50 MG Tabs  Commonly known as: Zoloft      Take 1 tablet (50 mg total) by mouth daily. Refills: 0            STOP taking these medications      Jardiance 25 MG Tabs  Generic drug: Empagliflozin        losartan 100 MG TABS 100 mg, hydroCHLOROthiazide 25 MG TABS 25 mg        verapamil  MG Tbcr  Commonly known as: Calan-SR                  Where to Get Your Medications        These medications were sent to [facility]pharm[facility] #[id number] [id number] Leanna [address], [phone], [address] [place] [address]      Phone: [phone]   amLODIPine 5 MG Tabs  atorvastatin 80 MG Tabs  BD Pen Needle Stephanie U/F 32G X 4 MM Misc  clopidogrel 75 MG Tabs  insulin aspart 100 Units/mL Sopn  insulin aspart 100 Units/mL Sopn  insulin detemir 100 UNIT/ML Sopn  losartan 100 MG Tabs       Please  your prescriptions at the location directed by your doctor or nurse    Bring a paper prescription for each of these medications  OneTouch Delica Lancets Fine Misc  [address] w/Device [address] reviewed: yes    Follow-up appointment:   ALAN Faustin [phone]    Go in 1 week(s)  Patient to schedule follow-up within one week for medication management. Patient agreed to call and schedule appointment if insurance is accepted.     Miguel Angel Torre MD  [address] [place] [address]  [phone]    Follow up in 1 week(s)  Office will call you for follow up appt. Or if insurance dictates it be through [facility] system then Sarika Patino or Amparo Ann    Your primary care physicain    Go to  appointment as scheduled next week    Chetna Perez  [address] [facility] [address]  [phone]    Follow up on 8/3/2023  Follow-up with PCP as scheduled for diabetes management resources. Appointments for Next 30 Days 2023 - 9/3/2023      None            Vital signs:           -----------------------------------------------------------------------------------------------  PATIENT DISCHARGE INSTRUCTIONS: See electronic chart    Paulo Stevenson MD    Total time spent on discharge plannin minutes     The [facility] makes medical notes like these available to patients in the interest of transparency. Please be advised this is a medical document. Medical documents are intended to carry relevant information, facts as evident, and the clinical opinion of the practitioner. The medical note is intended as peer to peer communication and may appear blunt or direct. It is written in medical language and may contain abbreviations or verbiage that are unfamiliar.

## 2023-07-31 NOTE — PLAN OF CARE
Lenard Wilson Patient Status:  Inpatient    10/18/1950 MRN [id number]   [facility] 2NE-A Attending Jose R Olivares MD   Hosp Day # 3 PCP None Pcp     Cardiology Nocturnal APN Plan of Care     Page Received: Bedside RN [id number]    Patient hypertensive, bradycardic s/t metoprolol for CTA coronaries      Assessment/Plan:   - PRN hydralazine 10 mg Q3H PRN, goal SBP <150         Alisha Bernstein, [address]  2023  1:32 AM

## 2023-07-31 NOTE — CONSULTS
[facility]  Diabetes Consult Note    Mike Jordan Patient Status:  Inpatient    10/18/1950 MRN [id number]   [facility] 2NE-A Attending Jade Msoes MD   Hosp Day # 3 PCP None Pcp     Reason for Consult:     Recommendations for uncontrolled type 2 diabetes    Diagnosis:    Patient Active Problem List:     Syncope and collapse     Syncope, cardiogenic     Elevated troponin     Hyperglycemia     Diabetes mellitus [facility])      Medical History:  Past Medical History:   Diagnosis Date    Anxiety state     Cataract     Diabetes ([address]     Esophageal reflux     Hearing impairment     Hepatitis     High blood pressure     Hypertension      History reviewed. No pertinent surgical history. History reviewed. No pertinent family history. Diabetes history:  Type:  Type 2  Onset: Approximately 30 years ago  Family history of DM: Unknown    Allergies:   Penicillin V            NAUSEA ONLY    Medications: Complete list reviewed. Active diabetes medications include detemir 14 units at bedtime; aspart, 1 unit for every 30 points greater than 140 mg/dl; aspart mealtime insulin 7 units per meal.    Home medications:  Metformin 500 mg - 2 tablets (1000 mg) 2 times daily; empagliflozin (Jardiance) 25 mg daily. Labs:    Recent Labs   Lab 23  0500 23  1238 23  1854 23  2123 23  0604   PGLU 160* 341* 210* 239* 138*     Recent Labs     23  1934 23  1939 23  2345 23  0018 23  0521 23  1603 23  0604     --   --   --  136  --   --      --   --   --  103  --   --    CO2 28.0  --   --   --  28.0  --   --    BUN 22*  --   --   --  20*  --   --    CREATSERUM 1.32*  --   --   --  1.20  --   --    A1C  --   --  13.3*  --   --   --   --    PGLU  --    < >  --    < >  --    < > 138*   CA 8.7  --   --   --  8.7  --   --    ALB 3.5  --   --   --   --   --   --     < > = values in this interval not displayed. History of Present Illness: Kwabena Rowland is a 71year old male with uncontrolled type 2 diabetes admitted 7/28/2023 for syncope. Assessment/Recommendations:    Met with the patient to discuss diabetes self-management. His wife and his daughter were at the bedside. PMH noted above. Patient was resting comfortably following cardiac catheterization for abnormal coronary CT, syncope. The patient reports he was diagnosed with pre-diabetes, then type 2 diabetes about 30 years ago. His diabetes is managed by his PCP, Kurt Greer MD at [facility]. He states his blood glucose has been generally well controlled over the years until recently. He states his weight does not vary, but when prescribed Ozempic by his PCP, he experienced a 30 lb weight loss and had GI side effects so it was discontinued in April of this year. It was replaced with Jardiance 25 mg daily. The patient reports he has been experiencing polyuria, polydipsia and polyphagia since that time and was shocked his A1C on this admission to the hospital was 13.3%. He stated he wants to fix this. Discussed the pathophysiology of type 2 diabetes, but also noted due to his current BMI of 24.60 kd/m2 it may be worth determining if has as PATRICK and would recommend discussing this with an endocrinologist outpatient. Reviewed the benefits of Jardiance, but also discussed the need to stay hydrated to prevent EUDKA. The patient stated he does avoid drinking water due to frequent urination. 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 Recommended a dietitian consult/outpatient follow-up for a diet plan and he stated he would comply. The patient follows a vegetarian diet. He stated he has a blood glucometer, but would like a CGM.   Provided literature on the [facility] and advised him to follow-up with an endocrinologist to place the PA for this product. For follow-up, the patient stated he has a good relationship with his PCP and left a message for her today to advise her of this hospitalization and to request a follow-up appointment. Discussed choice of endocrinologist and he he stated he will ask his PCP for a referral, but if he cannot get an appointment in 1-2 weeks, would like a referral to [facility]. Agreed to provide how to schedule that follow-up. See AVS    Plan:    Dietitian consult for introduction to carbohydrate counting and meal planning  Discharge to [facility]  Discharge to [facility] - ANN and Tamia Hebert; patient agreed to check with his insurance and his PCP. He may follow-up with [facility]  Patient to consider discharge to the [facility] for Initial Diabetes Self-Management Training Classes, -- no previous education    Nursing Recommendations:    RN to reinforce blood glucose monitoring with bedside glucometer  RN to teach insulin administration with an insulin pen - Reinforce the need to remove 2 caps from the home insulin pen needle   Patient to administer subcutaneously insulin injection with insulin pen under nursing supervision once return demonstration has verified patient's skill  RN to teach survival skills:  Provide \"Understanding Diabetes: Survival Skills and More!\" booklet and have patient/family view video on TV Education channel/Diabetes  Assist the patient/family with access to view the following videos: \"Everyone Can Count Carbohydrates\" - to view prior to dietitian consult  \"Taking Insulin\" - to view prior to teaching administering a subcutaneous insulin injection    PRESCRIPTION RECOMMENDATIONS:    Medicare:  Part A/B - A[employer]na Medicare  Insulin:   Novolog, Humalog, Levemir, Lantus  Recommend Levemir, 14 units at bedtime; Novolog Medium dose correction scale which is current dose.   May benefit from increase to detemir/Novolog based on patterns. Hospitalist to determine. Hold Jardiance - resume at follow-up as recommended by an endocrinologist  Supplies:  BD pen needles (Stephanie)  Glucometer:  Patient reports he is currently using the [employer] and has supplies  CGM:  [employer] or [employer] based on insurance formulary - PA required    PROVIDER F/U RECOMMENDATIONS:    TCC  Patient's current PCP  Endocrinologist - [facility]    A total of 40 minutes were spent with the patient, 100% was spent counseling and coordinating care for uncontrolled type 2 diabetes self-management including nutrition, exercise, blood glucose monitoring, insulin administration, medications, treatment options, follow-up coordination and resources.     Charli Bender, ANN  7/31/2023  10:58 AM

## 2023-07-31 NOTE — PLAN OF CARE
Pt is A&OX4. Room air, lungs clear. NSR on tele, HR 60s. Gated CTA tomorrow. PM metoprolol given per protocol. Patient bradycardic post 50mg metoprolol, asymptomatic. Continent B&B. Up with SBA. IVF infusing 100cc/hr - stopped d/t high BP. QID accucheck, wants more DM2  teaching. POC updated with pt, he verbalized understanding. Problem: Patient/Family Goals  Goal: Patient/Family Long Term Goal  Description: Patient's Long Term Goal: \"discharge\"    Interventions:  - CTA  - See additional Care Plan goals for specific interventions  Outcome: Progressing  Goal: Patient/Family Short Term Goal  Description: Patient's Short Term Goal: \"sleep\"    Interventions:   - cluster care  - See additional Care Plan goals for specific interventions  Outcome: Progressing     Problem: CARDIOVASCULAR - ADULT  Goal: Maintains optimal cardiac output and hemodynamic stability  Description: INTERVENTIONS:  - Monitor vital signs, rhythm, and trends  - Monitor for bleeding, hypotension and signs of decreased cardiac output  - Evaluate effectiveness of vasoactive medications to optimize hemodynamic stability  - Monitor arterial and/or venous puncture sites for bleeding and/or hematoma  - Assess quality of pulses, skin color and temperature  - Assess for signs of decreased coronary artery perfusion - ex.  Angina  - Evaluate fluid balance, assess for edema, trend weights  Outcome: Progressing  Goal: Absence of cardiac arrhythmias or at baseline  Description: INTERVENTIONS:  - Continuous cardiac monitoring, monitor vital signs, obtain 12 lead EKG if indicated  - Evaluate effectiveness of antiarrhythmic and heart rate control medications as ordered  - Initiate emergency measures for life threatening arrhythmias  - Monitor electrolytes and administer replacement therapy as ordered  Outcome: Progressing     Problem: NEUROLOGICAL - ADULT  Goal: Achieves stable or improved neurological status  Description: INTERVENTIONS  - Assess for and report changes in neurological status  - Initiate measures to prevent increased intracranial pressure  - Maintain blood pressure and fluid volume within ordered parameters to optimize cerebral perfusion and minimize risk of hemorrhage  - Monitor temperature, glucose, and sodium.  Initiate appropriate interventions as ordered  Outcome: Progressing

## 2023-07-31 NOTE — PROCEDURES
[address]    Cardiac Catheterization Note    Primary Proceduralist: Susy Carpio MD  Procedure Performed: [facility]  Date of Procedure: 7/31/2023   Indication: Syncope, abnormal coronary CT  Estimated blood loss: 10cc  Specimens: None    Consent obtained from the patient and documented in the paper chart, unless verbally obtained in an emergency setting. The benefits and risk of the procedure, including but not limited to infection, bleeding, myocardial infarction, stroke, vascular injury, emergency surgery, renal failure requiring dialysis and death were discussed with the patient. These complications occur in approximately 1-2% of elective procedures, but the risk may be significantly elevated in the setting of acute coronary syndrome, electrical or hemodynamic instability, multivessel disease, reduced LVEF or . The patient consented to any additional procedures performed emergently in order to address a complication or prevent medical deterioration. Viable alternatives were explained to the patient, including continuing medical therapy, with the risks incurred along that course. Procedure/Technique:    Lidocaine 1% was administered locally. Access of right radial artery obtained using Seldinger technique. Ultrasound was not used. 6 Fr Sheath was inserted. J-wire advanced into the aorta under fluoroscopy. Left coronary system engaged using 6Fr XB 3.5 Guide. Selective angiogram performed. Right coronary system engaged using 6 Fr TIG. Selective angiogram performed. 6 Fr pigtail Catheter advanced into the LV. Hemodynamics were obtained. Coronary Angiogram Findings:  LM: Large caliber artery giving rise to LAD & LCX. No significant stenosis. LAD: Large caliber artery giving rise to diagonal and septal branches. 100% mid LAD occlusion (). Right to left collateral filling with likely 70% distal LAD stenosis  90% diagonal artery stenosis.   LCX: Medium to large caliber artery giving rise to OM branches. 70% mid LCX stenosis  RCA: Large caliber artery giving rise to acute marginal branches, and bifurcates into RPDA & RPL. Right dominant circulation. Hemodynamics:  /6, LVEDP 16  /87, mean 112  No significant gradient upon Ao-LV pullback  LVEF 55-60%    Intervention:  6 Fr XB 3.5 Guide  Unable to cross mid LAD  using Fab Blue and corsaire/Fab Black    Monitored sedation administered by the cath lab RN, and supervised throughout the procedure by myself. Total time 26 minutes. Closure: Radial band. No immediate complications. A/P:  100% mid LAD , 90% diagonal artery, 70% mid LCX stenosis. Normal LVEF. LVEDP 16. Right dominant circulation  CTS evaluation for possible CABG. If not a candidate due to poor LAD target, would consider elective LAD  PCI. Continue ASA, statin.        [place] Riley MD  Interventional Cardiology  [address] [place] [address]

## 2023-08-01 VITALS
DIASTOLIC BLOOD PRESSURE: 86 MMHG | RESPIRATION RATE: 16 BRPM | HEART RATE: 83 BPM | BODY MASS INDEX: 24.68 KG/M2 | OXYGEN SATURATION: 98 % | SYSTOLIC BLOOD PRESSURE: 145 MMHG | HEIGHT: 69 IN | TEMPERATURE: 99 F | WEIGHT: 166.63 LBS

## 2023-08-01 LAB
ATRIAL RATE: 59 BPM
GLUCOSE BLD-MCNC: 161 MG/DL (ref 70–99)
GLUCOSE BLD-MCNC: 251 MG/DL (ref 70–99)
P AXIS: 50 DEGREES
P-R INTERVAL: 188 MS
Q-T INTERVAL: 408 MS
QRS DURATION: 84 MS
QTC CALCULATION (BEZET): 403 MS
R AXIS: -44 DEGREES
T AXIS: 76 DEGREES
VENTRICULAR RATE: 59 BPM

## 2023-08-01 PROCEDURE — 99232 SBSQ HOSP IP/OBS MODERATE 35: CPT | Performed by: CLINICAL NURSE SPECIALIST

## 2023-08-01 PROCEDURE — 99239 HOSP IP/OBS DSCHRG MGMT >30: CPT | Performed by: INTERNAL MEDICINE

## 2023-08-01 RX ORDER — LOSARTAN POTASSIUM 100 MG/1
100 TABLET ORAL DAILY
Qty: 30 TABLET | Refills: 3 | Status: SHIPPED | OUTPATIENT
Start: 2023-08-02

## 2023-08-01 RX ORDER — AMLODIPINE BESYLATE 5 MG/1
5 TABLET ORAL DAILY
Qty: 30 TABLET | Refills: 3 | Status: SHIPPED | OUTPATIENT
Start: 2023-08-01

## 2023-08-01 RX ORDER — AMLODIPINE BESYLATE 5 MG/1
5 TABLET ORAL 2 TIMES DAILY
Status: DISCONTINUED | OUTPATIENT
Start: 2023-08-01 | End: 2023-08-01

## 2023-08-01 RX ORDER — BLOOD-GLUCOSE METER
EACH MISCELLANEOUS
Qty: 1 KIT | Refills: 0 | Status: SHIPPED | OUTPATIENT
Start: 2023-08-01

## 2023-08-01 RX ORDER — ATORVASTATIN CALCIUM 80 MG/1
80 TABLET, FILM COATED ORAL NIGHTLY
Status: DISCONTINUED | OUTPATIENT
Start: 2023-08-01 | End: 2023-08-01

## 2023-08-01 RX ORDER — ATORVASTATIN CALCIUM 80 MG/1
80 TABLET, FILM COATED ORAL NIGHTLY
Qty: 30 TABLET | Refills: 3 | Status: SHIPPED | OUTPATIENT
Start: 2023-08-01

## 2023-08-01 RX ORDER — BLOOD SUGAR DIAGNOSTIC
STRIP MISCELLANEOUS
Qty: 100 STRIP | Refills: 6 | Status: SHIPPED | OUTPATIENT
Start: 2023-08-01

## 2023-08-01 RX ORDER — CLOPIDOGREL BISULFATE 75 MG/1
75 TABLET ORAL DAILY
Qty: 30 TABLET | Refills: 3 | Status: SHIPPED | OUTPATIENT
Start: 2023-08-01

## 2023-08-01 RX ORDER — PEN NEEDLE, DIABETIC 32GX 5/32"
NEEDLE, DISPOSABLE MISCELLANEOUS
Qty: 100 EACH | Refills: 6 | Status: SHIPPED | OUTPATIENT
Start: 2023-08-01

## 2023-08-01 NOTE — PLAN OF CARE
Received pt from IVS post procedure. Pt access was right radial artery. T-rom band in place. No drainage, soft, pulse palpable. Pt is confused, requires reorientation. Non compliant with safety precautions. Precautions reinforced. SR on cardiac monitor. BP elevated, hydralazine prn dose given. Pt upset that he hasn't been explained results. Prior to angiogram: Diabetic educational video shown to patient. Insulin administration with teach back given. Problem: CARDIOVASCULAR - ADULT  Goal: Maintains optimal cardiac output and hemodynamic stability  Description: INTERVENTIONS:  - Monitor vital signs, rhythm, and trends  - Monitor for bleeding, hypotension and signs of decreased cardiac output  - Evaluate effectiveness of vasoactive medications to optimize hemodynamic stability  - Monitor arterial and/or venous puncture sites for bleeding and/or hematoma  - Assess quality of pulses, skin color and temperature  - Assess for signs of decreased coronary artery perfusion - ex. Angina  - Evaluate fluid balance, assess for edema, trend weights  Outcome: Progressing  Goal: Absence of cardiac arrhythmias or at baseline  Description: INTERVENTIONS:  - Continuous cardiac monitoring, monitor vital signs, obtain 12 lead EKG if indicated  - Evaluate effectiveness of antiarrhythmic and heart rate control medications as ordered  - Initiate emergency measures for life threatening arrhythmias  - Monitor electrolytes and administer replacement therapy as ordered  Outcome: Progressing     Problem: NEUROLOGICAL - ADULT  Goal: Achieves stable or improved neurological status  Description: INTERVENTIONS  - Assess for and report changes in neurological status  - Initiate measures to prevent increased intracranial pressure  - Maintain blood pressure and fluid volume within ordered parameters to optimize cerebral perfusion and minimize risk of hemorrhage  - Monitor temperature, glucose, and sodium.  Initiate appropriate interventions as ordered  Outcome: Progressing

## 2023-08-01 NOTE — PROGRESS NOTES
[facility]  Diabetes Follow Up      Haroon Siddiqui  [id number]       Patient seen and evaluated; ready to go home. Daughter at bedside. Recent Labs   Lab 07/31/23  0604 07/31/23  1605 07/31/23  2120 08/01/23  0516 08/01/23  1206   PGLU 138* 239* 229* 161* 251*     No results for input(s): WBC, HGB, PLT, GLUCOSE in the last 72 hours. Recent Labs     07/29/23  1913 07/29/23  2117 08/01/23  1206   K 4.1  --   --    PGLU  --    < > 251*    < > = values in this interval not displayed. Lab Results   Component Value Date    PGLU 251 08/01/2023       Assessment/Recommendations: The patient states he met with CV surgery and will not have surgery and is being discharged today. Discussed vascular disease as a result of hyperglycemia and the patient endorsed his goal to improve his A1C to target. He states he has been administering long-acting, correction and mealtime insulin under the supervision of his RN with no problems. Explained how to calculate correction insulin dose using 1 unit for every 30 points blood glucose is greater than 140 mg/dl and he was able to perform the calculation. Explained he is to check his blood glucose before meals and based on that result, use this calculation for correction to target. Also explained he will be adding a fixed dose of insulin to this result to cover the food he is eating. He was able to teach back a correct answer. Plan is to talk to the hospitalist with discharge insulin and Jardiance recommendations. Spoke to Dr. Flora Paz and he agreed with recommendations below and will enter the insulin orders. Reviewed the rule of 15 for hypoglycemia and the patient agreed to primarily use glucose tablets to treat a low blood glucose due to convenience. Asked the patient if he had enough blood glucose testing supplies at home and he said he did but he has had it for a long time.   Asked if the dates were good on the test strips and he said they probably were not. Ordered a new glucometer, test strips and lancets. Once again the patient requested a CGM. Explained this will require a chart note sent to his insurance company to get the prior authorization, which is best done at outpatient. He could pay for one out of pocket, but it would require a prescription. He said he would discuss this with his PCP first to see if she can do this and if not, will ask the endocrinologist at follow-up. All questions addressed. No further recommendations. Patient to discharge. Plan:    One Touch glucometer ordered  Dietitian consult for introduction to carbohydrate counting and meal planning - complete  Discharge to [facility] endocrinologist or [facility] based on the patient's PCP recommendation. Outpatient diabetes education as recommended by patient's PCP or  [facility] for Initial Diabetes Self-Management Training Classes, no previous education  Discuss CGM with PCP or endocrinologist    Nursing Recommendations:    RN to reinforce blood glucose monitoring with bedside glucometer  Patient to administer subcutaneously insulin injection with insulin pen under nursing supervision once return demonstration has verified patient's skill  Reinforce how to calculate correction insulin and add fixed dose mealtime insulin  RN to teach survival skills:  - Rule of 15 for hypoglycemia - use glucose tablets  Provide \"Understanding Diabetes: Survival Skills and More!\" booklet and have patient/family view video on TV Education channel/Diabetes  Assist the patient/family with access to view the following videos:   \"Everyone Can Count Carbohydrates\" - to view prior to dietitian consult  \"Taking Insulin\" - to view prior to teaching administering a subcutaneous insulin injection    PRESCRIPTION RECOMMENDATIONS:    Medicare: A/B - Aetna Medicare  Insulin:  Novolog, Humalog, Levemir, Lantus  (If no secondary insurance, [facility])  Spoke with Dr. Kiara Brown - recommended the following:  Detemir 18 units at bedtime  Aspart - Mealtime - 4 units; correction 1 unit for every 30 points blood glucose is greater than 140 mg/dl. Hold Jardiance - to discuss with outpatient provider about restarting   Supplies:  BD pen needles (Stephanie)  Glucometer:  One Touch  CGM: To be ordered by outpatient PCP or endocrinologist - will require PA    PROVIDER F/U RECOMMENDATIONS:    TCC  Patient's current PCP  Endocrinologist    A total of 25 minutes were spent with the patient, 100% was spent counseling and coordinating care for uncontrolled type 2 diabetes self-management including nutrition, exercise, blood glucose monitoring, insulin administration, medications, treatment options, follow-up coordination and resources.     [facility], APRN  8/1/2023  2:08 PM

## 2023-08-01 NOTE — PLAN OF CARE
Anastasiya Calle Patient Status:  Inpatient    10/18/1950 MRN [id number]   [facility] 2NE-A Attending Michelle Tomlin MD   Hosp Day # 3 PCP None Pcp     Cardiology Nocturnal APN Plan of Care     Page Received: Bedside RN     Patient is s/p cardiac cath pending evaluation by CT Surgery for possible CABG. Called by RN that patient upset / frustrated as he stated he did not receive an update from the cardiology team but someone told him he was \"going to the OR tomorrow. \"  APN discussed at length the findings of his cath and why the CT Surgery team would evaluate him, and what that would entail. I expressed that he was not going to the OR in the AM and that no one would force him to have surgery. APN actively listened to patient concern and expressed that shock of sudden disease and frustration with lack of clarity was very understandable.        Assessment/Plan:   - Patient agrees to stay until the morning to receive updates from [facility] and [facility]  - APN available overnight if patient needs to speak further           Yaya Ma, [address]  2023  9:03 PM

## 2023-08-01 NOTE — PLAN OF CARE
Alert and oriented x4 on tele monitor hr 80's sinus rhythm. Right wrist pressure dressing c/d/I. No bleeding and no hematoma. Radial pulse present and palpable. C/o toothache and motrin 400 mg given as ordered. Updated w/ poc and family at bedside and patient spoke to jairo rivera on call and aware of result of heart cath and upset. All needs attended and will continue to monitor. Call light within reach. Problem: Patient/Family Goals  Goal: Patient/Family Long Term Goal  Description: Patient's Long Term Goal: \"discharge\"    Interventions:  - CTA  - See additional Care Plan goals for specific interventions  Outcome: Progressing  Goal: Patient/Family Short Term Goal  Description: Patient's Short Term Goal: \"sleep\"    Interventions:   - cluster care  - See additional Care Plan goals for specific interventions  Outcome: Progressing     Problem: CARDIOVASCULAR - ADULT  Goal: Maintains optimal cardiac output and hemodynamic stability  Description: INTERVENTIONS:  - Monitor vital signs, rhythm, and trends  - Monitor for bleeding, hypotension and signs of decreased cardiac output  - Evaluate effectiveness of vasoactive medications to optimize hemodynamic stability  - Monitor arterial and/or venous puncture sites for bleeding and/or hematoma  - Assess quality of pulses, skin color and temperature  - Assess for signs of decreased coronary artery perfusion - ex.  Angina  - Evaluate fluid balance, assess for edema, trend weights  Outcome: Progressing  Goal: Absence of cardiac arrhythmias or at baseline  Description: INTERVENTIONS:  - Continuous cardiac monitoring, monitor vital signs, obtain 12 lead EKG if indicated  - Evaluate effectiveness of antiarrhythmic and heart rate control medications as ordered  - Initiate emergency measures for life threatening arrhythmias  - Monitor electrolytes and administer replacement therapy as ordered  Outcome: Progressing     Problem: NEUROLOGICAL - ADULT  Goal: Achieves stable or improved neurological status  Description: INTERVENTIONS  - Assess for and report changes in neurological status  - Initiate measures to prevent increased intracranial pressure  - Maintain blood pressure and fluid volume within ordered parameters to optimize cerebral perfusion and minimize risk of hemorrhage  - Monitor temperature, glucose, and sodium.  Initiate appropriate interventions as ordered  Outcome: Progressing

## 2023-08-01 NOTE — PLAN OF CARE
Pt. Alert and oriented times 4. NSR on tele. BP's systolic in the 693'S. HR in the 70's to 80's. Pt. On RA. Up with standby assist. Continent of bowel and bladder. CV surg to see   Motrin for cavity pain. Okay to give per doctor. Pt. Updated on plan of care. Call light within reach. Problem: Patient/Family Goals  Goal: Patient/Family Long Term Goal  Description: Patient's Long Term Goal: \"discharge\"    Interventions:  - CTA  - See additional Care Plan goals for specific interventions  Outcome: Progressing  Goal: Patient/Family Short Term Goal  Description: Patient's Short Term Goal: \"sleep\"    Interventions:   - cluster care  - See additional Care Plan goals for specific interventions  Outcome: Progressing     Problem: CARDIOVASCULAR - ADULT  Goal: Maintains optimal cardiac output and hemodynamic stability  Description: INTERVENTIONS:  - Monitor vital signs, rhythm, and trends  - Monitor for bleeding, hypotension and signs of decreased cardiac output  - Evaluate effectiveness of vasoactive medications to optimize hemodynamic stability  - Monitor arterial and/or venous puncture sites for bleeding and/or hematoma  - Assess quality of pulses, skin color and temperature  - Assess for signs of decreased coronary artery perfusion - ex.  Angina  - Evaluate fluid balance, assess for edema, trend weights  Outcome: Progressing  Goal: Absence of cardiac arrhythmias or at baseline  Description: INTERVENTIONS:  - Continuous cardiac monitoring, monitor vital signs, obtain 12 lead EKG if indicated  - Evaluate effectiveness of antiarrhythmic and heart rate control medications as ordered  - Initiate emergency measures for life threatening arrhythmias  - Monitor electrolytes and administer replacement therapy as ordered  Outcome: Progressing     Problem: NEUROLOGICAL - ADULT  Goal: Achieves stable or improved neurological status  Description: INTERVENTIONS  - Assess for and report changes in neurological status  - Initiate measures to prevent increased intracranial pressure  - Maintain blood pressure and fluid volume within ordered parameters to optimize cerebral perfusion and minimize risk of hemorrhage  - Monitor temperature, glucose, and sodium.  Initiate appropriate interventions as ordered  Outcome: Progressing

## 2023-08-02 ENCOUNTER — PATIENT OUTREACH (OUTPATIENT)
Dept: CASE MANAGEMENT | Age: 73
End: 2023-08-02

## 2023-08-02 NOTE — PROGRESS NOTES
Initial Post Discharge Follow Up   Discharge Date: 8/1/23  Contact Date: 8/2/2023    Consent Verification:  Assessment Completed With: Patient  HIPAA Verified? Yes    Discharge Dx:     Syncope, cardiogenic     Was TCC ordered: no                   If TCC is recommended by [facility], why?  no PCP on file, dx. General:   How have you been since your discharge from the hospital? Spoke with patient states he is feeling great. Pt is following with outside PCP Dr. Mily Lowery through [facility], has scheduled appointment tomorrow 8/3/23 at 10am. Pt declined having any further questions. NCM closing encounter.

## 2023-08-02 NOTE — PAYOR COMM NOTE
--------------  DISCHARGE REVIEW    Payor: Benjamin Blayne #:  [id number]  Authorization Number: [id number]    Admit date: 7/28/23  Admit time:  11:14 PM  Discharge Date: 8/1/2023  4:32 PM     Admitting Physician: Danae Michaels DO  Attending Physician:  Jossy Zamarripa MD  Primary Care Physician: Pcp, None       Discharge Summary Notes    No notes of this type exist for this encounter.

## 2023-08-11 ENCOUNTER — HOSPITAL ENCOUNTER (OUTPATIENT)
Dept: INTERVENTIONAL RADIOLOGY/VASCULAR | Facility: HOSPITAL | Age: 73
Discharge: HOME | End: 2023-08-12
Attending: INTERNAL MEDICINE | Admitting: INTERNAL MEDICINE
Payer: MEDICARE

## 2023-08-11 DIAGNOSIS — I25.10 CHRONIC TOTAL OCCLUSION OF NATIVE CORONARY ARTERY: ICD-10-CM

## 2023-08-11 DIAGNOSIS — I25.82 CHRONIC TOTAL OCCLUSION OF NATIVE CORONARY ARTERY: ICD-10-CM

## 2023-08-11 PROBLEM — I10 BENIGN ESSENTIAL HTN: Status: ACTIVE | Noted: 2023-08-11

## 2023-08-11 PROBLEM — E11.8 TYPE 2 DIABETES MELLITUS WITH COMPLICATION (HCC): Status: ACTIVE | Noted: 2023-07-31

## 2023-08-11 LAB
ATRIAL RATE: 48 BPM
GLUCOSE BLD-MCNC: 127 MG/DL (ref 70–99)
GLUCOSE BLD-MCNC: 154 MG/DL (ref 70–99)
GLUCOSE BLD-MCNC: 256 MG/DL (ref 70–99)
GLUCOSE BLD-MCNC: 350 MG/DL (ref 70–99)
ISTAT ACTIVATED CLOTTING TIME: 305 SECONDS (ref 74–137)
ISTAT ACTIVATED CLOTTING TIME: 335 SECONDS (ref 74–137)
ISTAT ACTIVATED CLOTTING TIME: 360 SECONDS (ref 74–137)
P AXIS: 61 DEGREES
P-R INTERVAL: 204 MS
Q-T INTERVAL: 464 MS
QRS DURATION: 82 MS
QTC CALCULATION (BEZET): 414 MS
R AXIS: -33 DEGREES
T AXIS: 85 DEGREES
VENTRICULAR RATE: 48 BPM

## 2023-08-11 PROCEDURE — 027137Z DILATION OF CORONARY ARTERY, TWO ARTERIES WITH FOUR OR MORE DRUG-ELUTING INTRALUMINAL DEVICES, PERCUTANEOUS APPROACH: ICD-10-PCS | Performed by: INTERNAL MEDICINE

## 2023-08-11 PROCEDURE — B241ZZ3 ULTRASONOGRAPHY OF MULTIPLE CORONARY ARTERIES, INTRAVASCULAR: ICD-10-PCS | Performed by: INTERNAL MEDICINE

## 2023-08-11 PROCEDURE — 99215 OFFICE O/P EST HI 40 MIN: CPT | Performed by: HOSPITALIST

## 2023-08-11 RX ORDER — NICOTINE POLACRILEX 4 MG
15 LOZENGE BUCCAL
Status: DISCONTINUED | OUTPATIENT
Start: 2023-08-11 | End: 2023-08-12

## 2023-08-11 RX ORDER — SODIUM CHLORIDE 9 MG/ML
INJECTION, SOLUTION INTRAVENOUS
Status: DISCONTINUED | OUTPATIENT
Start: 2023-08-12 | End: 2023-08-11 | Stop reason: HOSPADM

## 2023-08-11 RX ORDER — IODIXANOL 320 MG/ML
100 INJECTION, SOLUTION INTRAVASCULAR
Status: COMPLETED | OUTPATIENT
Start: 2023-08-11 | End: 2023-08-11

## 2023-08-11 RX ORDER — HYDRALAZINE HYDROCHLORIDE 20 MG/ML
10 INJECTION INTRAMUSCULAR; INTRAVENOUS EVERY 6 HOURS PRN
Status: DISCONTINUED | OUTPATIENT
Start: 2023-08-11 | End: 2023-08-12

## 2023-08-11 RX ORDER — ONDANSETRON 2 MG/ML
INJECTION INTRAMUSCULAR; INTRAVENOUS
Status: COMPLETED
Start: 2023-08-11 | End: 2023-08-11

## 2023-08-11 RX ORDER — ASPIRIN 325 MG
TABLET, DELAYED RELEASE (ENTERIC COATED) ORAL
Status: COMPLETED
Start: 2023-08-11 | End: 2023-08-11

## 2023-08-11 RX ORDER — CLOPIDOGREL BISULFATE 75 MG/1
75 TABLET ORAL DAILY
Status: DISCONTINUED | OUTPATIENT
Start: 2023-08-12 | End: 2023-08-12

## 2023-08-11 RX ORDER — NICOTINE POLACRILEX 4 MG
30 LOZENGE BUCCAL
Status: DISCONTINUED | OUTPATIENT
Start: 2023-08-11 | End: 2023-08-12

## 2023-08-11 RX ORDER — ATORVASTATIN CALCIUM 80 MG/1
80 TABLET, FILM COATED ORAL NIGHTLY
Status: DISCONTINUED | OUTPATIENT
Start: 2023-08-11 | End: 2023-08-12

## 2023-08-11 RX ORDER — MIDAZOLAM HYDROCHLORIDE 1 MG/ML
INJECTION INTRAMUSCULAR; INTRAVENOUS
Status: COMPLETED
Start: 2023-08-11 | End: 2023-08-11

## 2023-08-11 RX ORDER — SODIUM CHLORIDE 9 MG/ML
INJECTION, SOLUTION INTRAVENOUS CONTINUOUS
Status: ACTIVE | OUTPATIENT
Start: 2023-08-11 | End: 2023-08-11

## 2023-08-11 RX ORDER — VERAPAMIL HYDROCHLORIDE 2.5 MG/ML
INJECTION, SOLUTION INTRAVENOUS
Status: COMPLETED
Start: 2023-08-11 | End: 2023-08-11

## 2023-08-11 RX ORDER — LIDOCAINE HYDROCHLORIDE 10 MG/ML
INJECTION, SOLUTION EPIDURAL; INFILTRATION; INTRACAUDAL; PERINEURAL
Status: COMPLETED
Start: 2023-08-11 | End: 2023-08-11

## 2023-08-11 RX ORDER — DEXTROSE MONOHYDRATE 25 G/50ML
50 INJECTION, SOLUTION INTRAVENOUS
Status: DISCONTINUED | OUTPATIENT
Start: 2023-08-11 | End: 2023-08-12

## 2023-08-11 RX ORDER — ASPIRIN 81 MG/1
81 TABLET ORAL DAILY
Status: DISCONTINUED | OUTPATIENT
Start: 2023-08-12 | End: 2023-08-12

## 2023-08-11 RX ORDER — HEPARIN SODIUM 5000 [USP'U]/ML
INJECTION, SOLUTION INTRAVENOUS; SUBCUTANEOUS
Status: COMPLETED
Start: 2023-08-11 | End: 2023-08-11

## 2023-08-11 RX ORDER — NITROGLYCERIN 20 MG/100ML
INJECTION INTRAVENOUS
Status: COMPLETED
Start: 2023-08-11 | End: 2023-08-11

## 2023-08-11 RX ADMIN — IODIXANOL 150 ML: 320 INJECTION, SOLUTION INTRAVASCULAR at 10:28:00

## 2023-08-11 RX ADMIN — ATORVASTATIN CALCIUM 80 MG: 80 TABLET, FILM COATED ORAL at 21:15:00

## 2023-08-11 NOTE — DISCHARGE INSTRUCTIONS
Home care instructions following coronary angiography, peripheral angiography, angioplasty (PTCA/PTA) or insertion of stent in the coronary, carotid, and/or peripheral arteries    Activity  -DO NOT drive after the procedure. You may resume driving late the following day according to the nurse or physicians instructions  -Plan of resting and relaxing tonight and tomorrow  -Resume your normal activity after 48 hours, or as instructed by your physician  -Do not lift anything over 10 pounds for the next 24 hours  -Avoid sexual activity for the next 24 hours  -Avoid drinking alcohol for the next 24 hours  -If the groin site was used, avoid repeated stair use and excessive walking for the next 24 hours  -If the wrist was used, avoid bending/flexing of the wrist for the next 24 hours    What is normal  -A small lump at the procedure site associated with mild tenderness when touched  -The procedure site may be bruised or discolored  -There may be a small amount of drainage on the bandage    Special Instructions  -Drink plenty of fluids during the next 24 hours to \"flush\" the contrast from your system  -The bandage is to remain in place for 24 hours  -Keep the bandage clean and dry  -DO NOT submerge the procedure site for 72 hours (no bath tubs or pools)       -This includes dishwashing/submersion of the wrist, if the wrist was used  -After 24 hours, you must remove the bandage  -You should shower after removing the bandage, and wash the procedure site gently with soap and water  -If you choose to wear a bandage for a few days, make sure it remains clean and dry and that it is changed daily    When you should NOTIFY YOUR PHYSICIAN  -Bleeding can occur at the procedure site-both on the outside of the skin and/or beneath the surface of the skin  -Swelling or a large lump at the procedure site can occur, which may be accompanied by moderate to severe pain    If either of the above occurs, lie down flat.  Have someone apply pressure to the procedure site with both hands, as instructed by the nurse. Hold pressure for 20 minutes and the bleeding should stop. Notify your physician of the occurrence. If the bleeding does not stop, call 911 and continue to apply pressure    -If you experience signs of a fever, temperature >101 degrees, chills, infection (redness, swelling, thick yellow drainage, foul odor from the procedure site)  -If you notice any numbness, tingling, or loss of feeling to your leg or foot for groin access  -If you notice any numbness, tingling, or loss of feeling to your fingers or hand, if wrist access was utilized    If you received a stent  -You will remain on an antiplatelet drug and/or aspirin. Antiplatelet medications are usually taken for six months to one year and should not be stopped unless your cardiologist directs you to do so. These medications help to prevent blockage at the stent site. If another physician or dentist asks you to stop your antiplatelet medication, you need to consult your cardiologist first. Together, your cardiologist and other physician can discuss the risks that may be involved if you are not taking the antiplatelet medication  -If an MRI is necessary, it may be done 4-6 weeks after your procedure. Verify this with your cardiologist  -Keep your stent card with you at all times! If you need a MRI in the future, your stent card will need to be shown to the technologist before performing the MRI. A duplicate card CANNOT be reproduced      Other  -You may resume your present diet, unless otherwise specified by your physician  -You may resume all of your medications as prescribed, unless otherwise directed by your physician. A list of your medications was provided to you at discharge  -Continue the walking program initiated in the hospital and progress your walking as directed.  Or, gradually resume your previous aerobic exercise schedule as tolerated  -Please call your physician's office for a follow-up appointment.  You should be seen in 2 weeks

## 2023-08-11 NOTE — DIETARY NOTE
Clinical Nutrition    Dietitian consult received per cardiac rehab standing order. Pt to be educated by cardiac rehab staff and encouraged to attend outpatient classes taught by RD. RD available PRN.     Lashae Reynolds MS, RD, LDN  Clinical Dietitian  Pager #: [phone]

## 2023-08-11 NOTE — PROCEDURES
[address]    Cardiac Catheterization Note    Primary Proceduralist: Bridget Sam MD  Procedure Performed: LAD  PCI, LAD IVUS, LCX PCI, LCX PCI  Date of Procedure: 8/11/2023   Indication: CAD  Estimated blood loss: 10cc  Specimens: None    Consent obtained from the patient and documented in the paper chart, unless verbally obtained in an emergency setting. The benefits and risk of the procedure, including but not limited to infection, bleeding, myocardial infarction, stroke, vascular injury, emergency surgery, renal failure requiring dialysis and death were discussed with the patient. These complications occur in approximately 1-2% of elective procedures, but the risk may be significantly elevated in the setting of acute coronary syndrome, electrical or hemodynamic instability, multivessel disease, reduced LVEF or . The patient consented to any additional procedures performed emergently in order to address a complication or prevent medical deterioration. Viable alternatives were explained to the patient, including continuing medical therapy, with the risks incurred along that course. Procedure/Technique:    Lidocaine 1% was administered locally. Access of right radial and right femoral obtained using Seldinger technique. Ultrasound was not used. 6 Fr Sheath was inserted into the radial artery and 7 Fr sheath into the femoral artery. J-wire advanced into the aorta under fluoroscopy.     Hemodynamics:  /56, mean 80      Intervention:  6 Fr JR4 Guide used to engage RCA  7 Fr EBU 3.5 Guide used to engage LM  Bilateral coronary angiogram performed to assess LAD  anatomy and collaterals  Georginaire jonas and Fab Bansal were able to cross subintimal within LAD  and re-enter luminally distally  Dilated LAD using 2.0 x 40mm balloon  IVUS performed  CRISTIAN x 2 (3.0 x 16mm and 2.5 x 48mm)  P.E using 2.5 NC then 3.0 NC then 3.5 NC  Angiogram showed MONIE III flow (LAD 100% > 0%)  Fab Blue crossed into distal LCX/large OM  Pre-dilated using 2.5 balloon  IVUS performed  CRISTIAN placement to mLCX x 2 (3.0 x 38mm and 3.0 x 16mm)  P.E using 3.0 NC up to high pressure  Angiogram showed MONIE III flow (80% > 0%)  PCI concluded      Monitored sedation administered by the cath lab RN, and supervised throughout the procedure by myself. Total time 106 minutes. Closure: Femoral angiogram performed. Perclosure was performed. No immediate complications. A/P:  Successful IVUS guided PCI of 100% mLAD  and 80% LCX tandem lesion.    Continue DAPT, statin  Tentative DC later today      Eugenie Paez MD  Interventional Cardiology  [address] [place] [address]

## 2023-08-11 NOTE — PROGRESS NOTES
Patient received back from cath lab at 1030. Right groin with dressing in place, CDI, soft. Right radial site with vascular band in place, CDI, soft. After about an hour, right groin noted to be oozing. Manual pressure held on 2 separate occasions, but still with slow ooze. Dressing removed, quick clot applied, and manual pressure held x15 minutes. Patient became bradycardic, nauseous, diaphoretic, clammy. SBP stable. IVF wide open. Dr Benji Jewell at bedside and atropine given. Dr Yolanda Alvarez updated and at bedside to see patient. Plan to keep patient overnight for observation. Attempted to removed vascular band after a couple of hours, but oozing. Vascular band placed back on and air intermittently removed over about 2 hours. Able to then remove band and pressure dressing applied. Report called to CTU RN, [facility]. Patient taken to room 8618. Bedside radial and groin assessment complete. VSS.

## 2023-08-12 VITALS
BODY MASS INDEX: 25.73 KG/M2 | WEIGHT: 173.75 LBS | HEART RATE: 56 BPM | OXYGEN SATURATION: 98 % | TEMPERATURE: 98 F | SYSTOLIC BLOOD PRESSURE: 139 MMHG | HEIGHT: 69 IN | DIASTOLIC BLOOD PRESSURE: 72 MMHG | RESPIRATION RATE: 18 BRPM

## 2023-08-12 LAB
ANION GAP SERPL CALC-SCNC: 2 MMOL/L (ref 0–18)
BUN BLD-MCNC: 13 MG/DL (ref 7–18)
CALCIUM BLD-MCNC: 8.1 MG/DL (ref 8.5–10.1)
CHLORIDE SERPL-SCNC: 108 MMOL/L (ref 98–112)
CO2 SERPL-SCNC: 29 MMOL/L (ref 21–32)
CREAT BLD-MCNC: 0.9 MG/DL
EGFRCR SERPLBLD CKD-EPI 2021: 91 ML/MIN/1.73M2 (ref 60–?)
ERYTHROCYTE [DISTWIDTH] IN BLOOD BY AUTOMATED COUNT: 12.8 %
GLUCOSE BLD-MCNC: 207 MG/DL (ref 70–99)
GLUCOSE BLD-MCNC: 231 MG/DL (ref 70–99)
HCT VFR BLD AUTO: 42.4 %
HGB BLD-MCNC: 14.2 G/DL
MCH RBC QN AUTO: 29.8 PG (ref 26–34)
MCHC RBC AUTO-ENTMCNC: 33.5 G/DL (ref 31–37)
MCV RBC AUTO: 89.1 FL
OSMOLALITY SERPL CALC.SUM OF ELEC: 294 MOSM/KG (ref 275–295)
PLATELET # BLD AUTO: 142 10(3)UL (ref 150–450)
POTASSIUM SERPL-SCNC: 3.7 MMOL/L (ref 3.5–5.1)
RBC # BLD AUTO: 4.76 X10(6)UL
SODIUM SERPL-SCNC: 139 MMOL/L (ref 136–145)
WBC # BLD AUTO: 9.7 X10(3) UL (ref 4–11)

## 2023-08-12 PROCEDURE — 99214 OFFICE O/P EST MOD 30 MIN: CPT | Performed by: INTERNAL MEDICINE

## 2023-08-12 RX ORDER — METOPROLOL SUCCINATE 25 MG/1
12.5 TABLET, EXTENDED RELEASE ORAL
Qty: 60 TABLET | Refills: 3 | Status: SHIPPED | OUTPATIENT
Start: 2023-08-12

## 2023-08-12 RX ADMIN — CLOPIDOGREL BISULFATE 75 MG: 75 TABLET ORAL at 08:22:00

## 2023-08-12 RX ADMIN — ASPIRIN 81 MG: 325 MG TABLET, DELAYED RELEASE (ENTERIC COATED) ORAL at 08:21:00

## 2023-08-12 RX ADMIN — ASPIRIN 81 MG: 81 TABLET ORAL at 08:21:00

## 2023-08-12 RX ADMIN — Medication 50 MG: at 08:21:00

## 2023-08-12 NOTE — PLAN OF CARE
Assumed care at approximately 1730 upon admission from cath lab s/p PCI. Pt alert, oriented x4. Oxygen saturations adequate on room air. Lung sounds clear bilaterally. Tele: NSR, sinus bradycardia. PCI today via right wrist and right groin. Right wrist site dressed with gauze and foam tape clean, dry, intact, soft, no hematoma. Right groin site with gauze and tegaderm dry, intact with old drainage, site soft, no hematoma. Mild bruising around site. Continent. Denies pain. Refusing bed alarm. Plan of care: discharge tomorrow pending clearance from consults. Pt updated on plan of care. Questions answered.

## 2023-08-12 NOTE — PLAN OF CARE
8560 - NURSING ADMISSION NOTE      Patient admitted via Cart from cath lab s/p PCI. Oriented to room. Safety precautions initiated. Bed in low position. Call light in reach. Admission navigator and medication reconciliation reviewed and completed with patient. Pt refusing bed alarm. Patient educated on fall risk, fall prevention measures. Pt continuing to refuse bed alarm.

## 2023-08-12 NOTE — PLAN OF CARE
Vitals stable, no complaints of pain or shortness of breath, cath sites x2 WNL, right hand and right leg CMS intact. Continue aspirin and plavix. Will hold metformin at home for 3 days post cath, discussed with patient and placed in AVS.  Left message for cardiac rehab, Charge nurse got a hold of them afterwards to deliver stent cards. DISCHARGE instructions thoroughly reviewed with patient and family member in the room, all questions answered, reviewed change in one med, patient verbalizes understanding of dc instructions. PIV's removed. Oozing at cath sites from overnight has stopped completely. Await stent cards to discharge. Will dc via wheelchair with family member.         Problem: Diabetes/Glucose Control  Goal: Glucose maintained within prescribed range  Description: INTERVENTIONS:  - Monitor Blood Glucose as ordered  - Assess for signs and symptoms of hyperglycemia and hypoglycemia  - Administer ordered medications to maintain glucose within target range  - Assess barriers to adequate nutritional intake and initiate nutrition consult as needed  - Instruct patient on self management of diabetes  Outcome: Adequate for Discharge     Problem: Patient/Family Goals  Goal: Patient/Family Long Term Goal  Description: Patient's Long Term Goal: to stay out of the hospital     Interventions:  - follow up with pcp   - take medications as MD prescribes     - See additional Care Plan goals for specific interventions  Outcome: Adequate for Discharge  Goal: Patient/Family Short Term Goal  Description: Patient's Short Term Goal: to feel better    Interventions:   - monitor on tele  - labs     - See additional Care Plan goals for specific interventions  Outcome: Adequate for Discharge

## 2023-08-12 NOTE — PLAN OF CARE
A&Ox4  O2 sat WNL on RA  SR/SB on tele. R wrist wrapped in pressure tape. Sensation normal.   R groin bruised, tender to touch. Soft. Pt refusing bed alarm Fall precautions explained to pt. Pt stated he will call for help getting up if he feels dizzy/weak.    Up ad steven      Problem: Diabetes/Glucose Control  Goal: Glucose maintained within prescribed range  Description: INTERVENTIONS:  - Monitor Blood Glucose as ordered  - Assess for signs and symptoms of hyperglycemia and hypoglycemia  - Administer ordered medications to maintain glucose within target range  - Assess barriers to adequate nutritional intake and initiate nutrition consult as needed  - Instruct patient on self management of diabetes  Outcome: Progressing     Problem: Patient/Family Goals  Goal: Patient/Family Long Term Goal  Description: Patient's Long Term Goal: to stay out of the hospital     Interventions:  - follow up with pcp   - take medications as MD prescribes     - See additional Care Plan goals for specific interventions  Outcome: Progressing  Goal: Patient/Family Short Term Goal  Description: Patient's Short Term Goal: to feel better    Interventions:   - monitor on tele  - labs     - See additional Care Plan goals for specific interventions  Outcome: Progressing

## 2023-08-12 NOTE — CARDIAC REHAB
CAD/stent education completed with pt, stent card given. Phase 2 CR offered; contact info given for [facility] CR dept.

## 2023-08-14 ENCOUNTER — PATIENT OUTREACH (OUTPATIENT)
Dept: CASE MANAGEMENT | Age: 73
End: 2023-08-14

## 2023-08-14 NOTE — PROGRESS NOTES
TCM chart review. No TCM as patient follows with outside [facility] PCP. Encounter closing.

## 2023-08-21 ENCOUNTER — PATIENT OUTREACH (OUTPATIENT)
Dept: CASE MANAGEMENT | Age: 73
End: 2023-08-21

## 2023-08-21 NOTE — PROGRESS NOTES
1st attempt [facility] apt request   DM -decline, pt doesn't want to schedule at this time  [facility] -existing apt (8/24)    Confirmed w/ pt  Closing encounter

## (undated) NOTE — LETTER
[facility]  Azam Flood 62 [id number]  Consent for Procedure/Sedation  Date: 07/31/23         Time: 200    I hereby authorize Dr Wilbert Lee, my physician and his/her assistants (if applicable), which may include medical students, residents, and/or fellows, to perform the following surgical operation/ procedure and administer such anesthesia as may be determined necessary by my physician:  Operation/Procedure name (s)  Cardiac Catheterization, Left Ventricular Cineangiography, Bilateral Selective Coronary Angiography and/or Right Heart Catheterization; possible Percutaneous Transluminal Coronary Angioplasty, Coronary Atherectomy, Coronary Stent, Intracoronary Thrombolytic therapy, Antiplatelet therapy and/[facility]   2.   I recognize that during the surgical operation/procedure, unforeseen conditions may necessitate additional or different procedures than those listed above. I, therefore, further authorize and request that the above-named surgeon, assistants, or designees perform such procedures as are, in their judgment, necessary and desirable. 3.   My surgeon/physician has discussed prior to my surgery the potential benefits, risks and side effects of this procedure; the likelihood of achieving goals; and potential problems that might occur during recuperation. They also discussed reasonable alternatives to the procedure, including risks, benefits, and side effects related to the alternatives and risks related to not receiving this procedure. I have had all my questions answered and I acknowledge that no guarantee has been made as to the result that may be obtained. 4.   Should the need arise during my operation/procedure, which includes change of level of care prior to discharge, I also consent to the administration of blood and/or blood products.   Further, I understand that despite careful testing and screening of blood or blood products by collecting agencies, I may still be subject to ill effects as a result of receiving a blood transfusion and/or blood products. The following are some, but not all, of the potential risks that can occur: fever and allergic reactions, hemolytic reactions, transmission of diseases such as Hepatitis, AIDS and Cytomegalovirus (CMV) and fluid overload. In the event that I wish to have an autologous transfusion of my own blood, or a directed donor transfusion, I will discuss this with my physician. Check only if Refusing Blood or Blood Products  I understand refusal of blood or blood products as deemed necessary by my physician may have serious consequences to my condition to include possible death. I hereby assume responsibility for my refusal and release the hospital, its personnel, and my physicians from any responsibility for the consequences of my refusal.          o  Refuse      5. I authorize the use of any specimen, organs, tissues, body parts or foreign objects that may be removed from my body during the operation/procedure for diagnosis, research or teaching purposes and their subsequent disposal by hospital authorities. I also authorize the release of specimen test results and/or written reports to my treating physician on the hospital medical staff or other referring or consulting physicians involved in my care, at the discretion of the Pathologist or my treating physician. 6.   I consent to the photographing or videotaping of the operations or procedures to be performed, including appropriate portions of my body for medical, scientific, or educational purposes, provided my identity is not revealed by the pictures or by descriptive texts accompanying them. If the procedure has been photographed/videotaped, the surgeon will obtain the original picture, image, videotape or CD.   The hospital will not be responsible for storage, release or maintenance of the picture, image, tape or CD.    7.   I consent to the presence of a  or observers in the operating room as deemed necessary by my physician or their designees. 8.   I recognize that in the event my procedure results in extended X-Ray/fluoroscopy time, I may develop a skin reaction. 9. If I have a Do Not Attempt Resuscitation (DNAR) order in place, that status will be suspended while in the operating room, procedural suite, and during the recovery period unless otherwise explicitly stated by me (or a person authorized to consent on my behalf). The surgeon or my attending physician will determine when the applicable recovery period ends for purposes of reinstating the DNAR order. 10. Patients having a sterilization procedure: I understand that if the procedure is successful the results will be permanent and it will therefore be impossible for me to inseminate, conceive, or bear children. I also understand that the procedure is intended to result in sterility, although the result has not been guaranteed. 11. I acknowledge that my physician has explained sedation/analgesia administration to me including the risk and benefits I consent to the administration of sedation/analgesia as may be necessary or desirable in the judgment of my physician.     I CERTIFY THAT I HAVE READ AND FULLY UNDERSTAND THE ABOVE CONSENT TO OPERATION and/or OTHER PROCEDURE.        ____________________________________       _________________________________      ______________________________  Signature of Patient         Signature of Responsible Person        Printed Name of Responsible Person    ____________________________________      _________________________________      ______________________________       Signature of Witness          Relationship to Patient                       Date                                       Time  Patient Name: Kuldeep Pathak     : 10/18/1950                 Printed: 2023      Medical Record #: [id number]                      Page 1 of 2